# Patient Record
Sex: MALE | Race: BLACK OR AFRICAN AMERICAN | NOT HISPANIC OR LATINO
[De-identification: names, ages, dates, MRNs, and addresses within clinical notes are randomized per-mention and may not be internally consistent; named-entity substitution may affect disease eponyms.]

---

## 2017-09-18 ENCOUNTER — APPOINTMENT (OUTPATIENT)
Dept: PULMONOLOGY | Facility: CLINIC | Age: 42
End: 2017-09-18

## 2019-05-23 ENCOUNTER — APPOINTMENT (OUTPATIENT)
Dept: NEUROLOGY | Facility: CLINIC | Age: 44
End: 2019-05-23
Payer: OTHER MISCELLANEOUS

## 2019-05-23 VITALS
OXYGEN SATURATION: 99 % | HEIGHT: 71 IN | WEIGHT: 270 LBS | SYSTOLIC BLOOD PRESSURE: 140 MMHG | DIASTOLIC BLOOD PRESSURE: 90 MMHG | BODY MASS INDEX: 37.8 KG/M2

## 2019-05-23 DIAGNOSIS — Z86.59 PERSONAL HISTORY OF OTHER MENTAL AND BEHAVIORAL DISORDERS: ICD-10-CM

## 2019-05-23 DIAGNOSIS — Z87.09 PERSONAL HISTORY OF OTHER DISEASES OF THE RESPIRATORY SYSTEM: ICD-10-CM

## 2019-05-23 PROCEDURE — 99204 OFFICE O/P NEW MOD 45 MIN: CPT

## 2019-05-23 RX ORDER — OXYCODONE HYDROCHLORIDE AND ACETAMINOPHEN 10; 325 MG/1; MG/1
TABLET ORAL
Refills: 0 | Status: ACTIVE | COMMUNITY

## 2019-05-23 RX ORDER — PRAZOSIN HYDROCHLORIDE 5 MG/1
CAPSULE ORAL
Refills: 0 | Status: ACTIVE | COMMUNITY

## 2019-05-23 RX ORDER — SILDENAFIL CITRATE 100 MG/1
TABLET, FILM COATED ORAL
Refills: 0 | Status: ACTIVE | COMMUNITY

## 2019-05-23 RX ORDER — TRAZODONE HYDROCHLORIDE 300 MG/1
TABLET ORAL
Refills: 0 | Status: ACTIVE | COMMUNITY

## 2019-05-23 RX ORDER — ALBUTEROL SULFATE 90 UG/1
AEROSOL, METERED RESPIRATORY (INHALATION)
Refills: 0 | Status: ACTIVE | COMMUNITY

## 2019-05-23 NOTE — PHYSICAL EXAM
[General Appearance - Alert] : alert [General Appearance - In No Acute Distress] : in no acute distress [Oriented To Time, Place, And Person] : oriented to person, place, and time [Memory Recent] : recent memory was not impaired [Memory Remote] : remote memory was not impaired [Cranial Nerves Optic (II)] : visual acuity intact bilaterally,  visual fields full to confrontation, pupils equal round and reactive to light [Cranial Nerves Oculomotor (III)] : extraocular motion intact [Cranial Nerves Trigeminal (V)] : facial sensation intact symmetrically [Cranial Nerves Facial (VII)] : face symmetrical [Cranial Nerves Vestibulocochlear (VIII)] : hearing was intact bilaterally [Cranial Nerves Glossopharyngeal (IX)] : tongue and palate midline [Cranial Nerves Accessory (XI - Cranial And Spinal)] : head turning and shoulder shrug symmetric [Cranial Nerves Hypoglossal (XII)] : there was no tongue deviation with protrusion [Motor Tone] : muscle tone was normal in all four extremities [Motor Strength] : muscle strength was normal in all four extremities [Sensation Tactile Decrease] : light touch was intact [Sensation Pain / Temperature Decrease] : pain and temperature was intact [Sensation Vibration Decrease] : vibration was intact [2+] : Ankle jerk left 2+ [Optic Disc Abnormality] : the optic disc were normal in size and color [Edema] : there was no peripheral edema [Involuntary Movements] : no involuntary movements were seen [FreeTextEntry1] : Affect was somewhat blunted. [Dysarthria] : no dysarthria [Aphasia] : no dysphasia/aphasia [Romberg's Sign] : Romberg's sign was negtive [Coordination - Dysmetria Impaired Finger-to-Nose Bilateral] : not present [Plantar Reflex Right Only] : normal on the right [Plantar Reflex Left Only] : normal on the left [FreeTextEntry6] : Manual motor testing was markedly limited at the right deltoid and right knee [FreeTextEntry8] : Gait is antalgic. [FreeTextEntry9] : Right knee was not tested at the patient's request
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
## 2019-05-23 NOTE — CONSULT LETTER
[Dear  ___] : Dear  [unfilled], [Courtesy Letter:] : I had the pleasure of seeing your patient, [unfilled], in my office today. [Please see my note below.] : Please see my note below. [Consult Closing:] : Thank you very much for allowing me to participate in the care of this patient.  If you have any questions, please do not hesitate to contact me. [Sincerely,] : Sincerely, [FreeTextEntry3] : Richard Herman MD.  [DrRadha  ___] : Dr. RICHARDS

## 2019-05-23 NOTE — DATA REVIEWED
[de-identified] : Brain MRI was performed on 3/16/18 at [facility].\par The study was unremarkable.\par \par Cervical spine MRI was performed on 4/17/18 at the same institution.\par The study demonstrated reversal of the normal stroke consistent with muscle spasm. There was some disc bulging at C3/4, C4/5, and C5/6. The remainder of the discs were described as normal.\par \par Lumbar spine MRI was performed on 4/26/18 at the same institution.\par The study was reported as normal.\par \par
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
## 2019-05-23 NOTE — REVIEW OF SYSTEMS
[Feeling Tired] : feeling tired [Anxiety] : anxiety [Depression] : depression [Shortness Of Breath] : shortness of breath [As Noted in HPI] : as noted in HPI [Negative] : Heme/Lymph

## 2019-07-19 ENCOUNTER — APPOINTMENT (OUTPATIENT)
Dept: NEUROLOGY | Facility: CLINIC | Age: 44
End: 2019-07-19

## 2019-08-07 ENCOUNTER — APPOINTMENT (OUTPATIENT)
Dept: NEUROLOGY | Facility: CLINIC | Age: 44
End: 2019-08-07
Payer: OTHER MISCELLANEOUS

## 2019-08-07 VITALS
SYSTOLIC BLOOD PRESSURE: 120 MMHG | WEIGHT: 265 LBS | DIASTOLIC BLOOD PRESSURE: 70 MMHG | HEIGHT: 71 IN | BODY MASS INDEX: 37.1 KG/M2

## 2019-08-07 DIAGNOSIS — G44.89 OTHER HEADACHE SYNDROME: ICD-10-CM

## 2019-08-07 DIAGNOSIS — S09.90XS UNSPECIFIED INJURY OF HEAD, SEQUELA: ICD-10-CM

## 2019-08-07 PROCEDURE — 99213 OFFICE O/P EST LOW 20 MIN: CPT

## 2019-08-07 RX ORDER — DICLOFENAC POTASSIUM 50 MG/1
TABLET, COATED ORAL
Refills: 0 | Status: COMPLETED | COMMUNITY
End: 2019-08-07

## 2019-08-07 RX ORDER — LOSARTAN POTASSIUM 50 MG/1
50 TABLET, FILM COATED ORAL
Qty: 30 | Refills: 0 | Status: ACTIVE | COMMUNITY
Start: 2019-03-13

## 2019-08-07 RX ORDER — LIDOCAINE 5% 700 MG/1
5 PATCH TOPICAL
Qty: 30 | Refills: 0 | Status: ACTIVE | COMMUNITY
Start: 2019-04-09

## 2019-08-07 RX ORDER — BUPROPION HYDROCHLORIDE 150 MG/1
150 TABLET, EXTENDED RELEASE ORAL
Qty: 30 | Refills: 0 | Status: ACTIVE | COMMUNITY
Start: 2019-07-03

## 2019-08-07 RX ORDER — ALPRAZOLAM 2 MG/1
TABLET ORAL
Refills: 0 | Status: COMPLETED | COMMUNITY
End: 2019-08-07

## 2019-08-07 RX ORDER — BENZONATATE 100 MG/1
100 CAPSULE ORAL
Qty: 21 | Refills: 0 | Status: ACTIVE | COMMUNITY
Start: 2019-03-21

## 2019-08-07 RX ORDER — TIZANIDINE 2 MG/1
2 TABLET ORAL
Qty: 30 | Refills: 0 | Status: ACTIVE | COMMUNITY
Start: 2019-05-03

## 2019-08-07 RX ORDER — CHLORHEXIDINE GLUCONATE, 0.12% ORAL RINSE 1.2 MG/ML
0.12 SOLUTION DENTAL
Qty: 473 | Refills: 0 | Status: ACTIVE | COMMUNITY
Start: 2019-03-15

## 2019-08-07 RX ORDER — SILDENAFIL 20 MG/1
20 TABLET ORAL
Qty: 90 | Refills: 0 | Status: COMPLETED | COMMUNITY
Start: 2019-07-18

## 2019-08-07 RX ORDER — NAPROXEN 500 MG/1
500 TABLET ORAL
Qty: 20 | Refills: 0 | Status: ACTIVE | COMMUNITY
Start: 2019-03-21

## 2019-08-07 RX ORDER — TOPIRAMATE 50 MG/1
50 TABLET, FILM COATED ORAL
Qty: 30 | Refills: 3 | Status: ACTIVE | COMMUNITY
Start: 2019-05-23 | End: 1900-01-01

## 2019-08-07 RX ORDER — ONDANSETRON 4 MG/1
4 TABLET, ORALLY DISINTEGRATING ORAL
Qty: 9 | Refills: 0 | Status: ACTIVE | COMMUNITY
Start: 2019-02-25

## 2019-08-07 RX ORDER — ALPRAZOLAM 0.5 MG/1
0.5 TABLET ORAL
Qty: 30 | Refills: 0 | Status: ACTIVE | COMMUNITY
Start: 2019-03-05

## 2019-08-07 RX ORDER — DICLOFENAC SODIUM 75 MG/1
75 TABLET, DELAYED RELEASE ORAL
Qty: 30 | Refills: 0 | Status: ACTIVE | COMMUNITY
Start: 2019-01-24

## 2019-08-07 RX ORDER — BUPROPION HYDROCHLORIDE 100 MG/1
TABLET, FILM COATED ORAL
Refills: 0 | Status: COMPLETED | COMMUNITY
End: 2019-08-07

## 2019-08-07 RX ORDER — DESVENLAFAXINE 100 MG/1
100 TABLET, EXTENDED RELEASE ORAL
Qty: 30 | Refills: 0 | Status: ACTIVE | COMMUNITY
Start: 2018-12-05
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
## 2019-08-07 NOTE — PHYSICAL EXAM
[General Appearance - Alert] : alert [General Appearance - In No Acute Distress] : in no acute distress [Oriented To Time, Place, And Person] : oriented to person, place, and time [Memory Recent] : recent memory was not impaired [Memory Remote] : remote memory was not impaired [Cranial Nerves Optic (II)] : visual acuity intact bilaterally,  visual fields full to confrontation, pupils equal round and reactive to light [Cranial Nerves Oculomotor (III)] : extraocular motion intact [Cranial Nerves Trigeminal (V)] : facial sensation intact symmetrically [Cranial Nerves Facial (VII)] : face symmetrical [Cranial Nerves Vestibulocochlear (VIII)] : hearing was intact bilaterally [Cranial Nerves Glossopharyngeal (IX)] : tongue and palate midline [Cranial Nerves Accessory (XI - Cranial And Spinal)] : head turning and shoulder shrug symmetric [Cranial Nerves Hypoglossal (XII)] : there was no tongue deviation with protrusion [Motor Tone] : muscle tone was normal in all four extremities [Motor Strength] : muscle strength was normal in all four extremities [Sensation Pain / Temperature Decrease] : pain and temperature was intact [Sensation Tactile Decrease] : light touch was intact [Sensation Vibration Decrease] : vibration was intact [2+] : Ankle jerk right 2+ [Optic Disc Abnormality] : the optic disc were normal in size and color [Involuntary Movements] : no involuntary movements were seen [Edema] : there was no peripheral edema [FreeTextEntry1] : Affect was somewhat blunted. [Dysarthria] : no dysarthria [Aphasia] : no dysphasia/aphasia [Romberg's Sign] : Romberg's sign was negtive [Coordination - Dysmetria Impaired Finger-to-Nose Bilateral] : not present [Plantar Reflex Right Only] : normal on the right [Plantar Reflex Left Only] : normal on the left [FreeTextEntry6] : Manual motor testing was markedly limited at the right deltoid and right knee [FreeTextEntry8] : Gait is antalgic. [FreeTextEntry9] : Right knee was not tested at the patient's request

## 2019-08-07 NOTE — DATA REVIEWED
[de-identified] : Brain MRI was performed on 3/16/18 at [facility].\par The study was unremarkable.\par \par Cervical spine MRI was performed on 4/17/18 at the same institution.\par The study demonstrated reversal of the normal stroke consistent with muscle spasm. There was some disc bulging at C3/4, C4/5, and C5/6. The remainder of the discs were described as normal.\par \par Lumbar spine MRI was performed on 4/26/18 at the same institution.\par The study was reported as normal.\par \par

## 2019-08-07 NOTE — CONSULT LETTER
[Dear  ___] : Dear  [unfilled], [Courtesy Letter:] : I had the pleasure of seeing your patient, [unfilled], in my office today. [Please see my note below.] : Please see my note below. [Consult Closing:] : Thank you very much for allowing me to participate in the care of this patient.  If you have any questions, please do not hesitate to contact me. [Sincerely,] : Sincerely, [DrRadha  ___] : Dr. RICHARDS [FreeTextEntry3] : Richard Herman MD.

## 2019-08-07 NOTE — REVIEW OF SYSTEMS
[Feeling Tired] : feeling tired [Anxiety] : anxiety [Depression] : depression [As Noted in HPI] : as noted in HPI [Shortness Of Breath] : shortness of breath [Negative] : Heme/Lymph

## 2019-12-11 ENCOUNTER — APPOINTMENT (OUTPATIENT)
Dept: NEUROLOGY | Facility: CLINIC | Age: 44
End: 2019-12-11

## 2020-01-06 ENCOUNTER — OUTPATIENT (OUTPATIENT)
Dept: OUTPATIENT SERVICES | Facility: HOSPITAL | Age: 45
LOS: 1 days | End: 2020-01-06
Payer: COMMERCIAL

## 2020-01-06 VITALS
OXYGEN SATURATION: 98 % | WEIGHT: 262.35 LBS | HEART RATE: 100 BPM | HEIGHT: 70.75 IN | TEMPERATURE: 98 F | SYSTOLIC BLOOD PRESSURE: 126 MMHG | RESPIRATION RATE: 16 BRPM | DIASTOLIC BLOOD PRESSURE: 82 MMHG

## 2020-01-06 DIAGNOSIS — G56.02 CARPAL TUNNEL SYNDROME, LEFT UPPER LIMB: ICD-10-CM

## 2020-01-06 DIAGNOSIS — Z98.890 OTHER SPECIFIED POSTPROCEDURAL STATES: Chronic | ICD-10-CM

## 2020-01-06 DIAGNOSIS — Z01.818 ENCOUNTER FOR OTHER PREPROCEDURAL EXAMINATION: ICD-10-CM

## 2020-01-06 DIAGNOSIS — G56.22 LESION OF ULNAR NERVE, LEFT UPPER LIMB: ICD-10-CM

## 2020-01-06 LAB
ANION GAP SERPL CALC-SCNC: 10 MMOL/L — SIGNIFICANT CHANGE UP (ref 5–17)
BUN SERPL-MCNC: 10 MG/DL — SIGNIFICANT CHANGE UP (ref 7–23)
CALCIUM SERPL-MCNC: 8.1 MG/DL — LOW (ref 8.4–10.5)
CHLORIDE SERPL-SCNC: 100 MMOL/L — SIGNIFICANT CHANGE UP (ref 96–108)
CO2 SERPL-SCNC: 26 MMOL/L — SIGNIFICANT CHANGE UP (ref 22–31)
CREAT SERPL-MCNC: 1.11 MG/DL — SIGNIFICANT CHANGE UP (ref 0.5–1.3)
GLUCOSE SERPL-MCNC: 103 MG/DL — HIGH (ref 70–99)
HBA1C BLD-MCNC: 5.4 % — SIGNIFICANT CHANGE UP (ref 4–5.6)
HCT VFR BLD CALC: 40.8 % — SIGNIFICANT CHANGE UP (ref 39–50)
HGB BLD-MCNC: 13.1 G/DL — SIGNIFICANT CHANGE UP (ref 13–17)
MCHC RBC-ENTMCNC: 27.3 PG — SIGNIFICANT CHANGE UP (ref 27–34)
MCHC RBC-ENTMCNC: 32.1 GM/DL — SIGNIFICANT CHANGE UP (ref 32–36)
MCV RBC AUTO: 85 FL — SIGNIFICANT CHANGE UP (ref 80–100)
NRBC # BLD: 0 /100 WBCS — SIGNIFICANT CHANGE UP (ref 0–0)
PLATELET # BLD AUTO: 215 K/UL — SIGNIFICANT CHANGE UP (ref 150–400)
POTASSIUM SERPL-MCNC: 3.2 MMOL/L — LOW (ref 3.5–5.3)
POTASSIUM SERPL-SCNC: 3.2 MMOL/L — LOW (ref 3.5–5.3)
RBC # BLD: 4.8 M/UL — SIGNIFICANT CHANGE UP (ref 4.2–5.8)
RBC # FLD: 14.5 % — SIGNIFICANT CHANGE UP (ref 10.3–14.5)
SODIUM SERPL-SCNC: 136 MMOL/L — SIGNIFICANT CHANGE UP (ref 135–145)
WBC # BLD: 8.54 K/UL — SIGNIFICANT CHANGE UP (ref 3.8–10.5)
WBC # FLD AUTO: 8.54 K/UL — SIGNIFICANT CHANGE UP (ref 3.8–10.5)

## 2020-01-06 PROCEDURE — 93010 ELECTROCARDIOGRAM REPORT: CPT

## 2020-01-06 PROCEDURE — 85027 COMPLETE CBC AUTOMATED: CPT

## 2020-01-06 PROCEDURE — 80048 BASIC METABOLIC PNL TOTAL CA: CPT

## 2020-01-06 PROCEDURE — 93005 ELECTROCARDIOGRAM TRACING: CPT

## 2020-01-06 PROCEDURE — 36415 COLL VENOUS BLD VENIPUNCTURE: CPT

## 2020-01-06 PROCEDURE — 83036 HEMOGLOBIN GLYCOSYLATED A1C: CPT

## 2020-01-06 PROCEDURE — G0463: CPT

## 2020-01-06 NOTE — H&P PST ADULT - NSICDXPASTSURGICALHX_GEN_ALL_CORE_FT
PAST SURGICAL HISTORY:  H/O arthroscopy of right knee 2012 and 2019    H/O umbilical hernia repair 2011    History of arthroscopy of right shoulder 2019

## 2020-01-06 NOTE — H&P PST ADULT - RS GEN PE MLT RESP DETAILS PC
normal/airway patent/breath sounds equal/good air movement/no rhonchi/no wheezes/respirations non-labored/clear to auscultation bilaterally/no rales

## 2020-01-06 NOTE — H&P PST ADULT - MUSCULOSKELETAL
details… detailed exam diminished strength/ROM intact/no joint swelling/no joint erythema/no joint warmth/no calf tenderness/left hand, negative phalen's sign and positive tinel's sign

## 2020-01-06 NOTE — H&P PST ADULT - NSANTHOSAYNRD_GEN_A_CORE
No. CNYTHIA screening performed.  STOP BANG Legend: 0-2 = LOW Risk; 3-4 = INTERMEDIATE Risk; 5-8 = HIGH Risk/neck No. CYNTHIA screening performed.  STOP BANG Legend: 0-2 = LOW Risk; 3-4 = INTERMEDIATE Risk; 5-8 = HIGH Risk/neck 17 inches

## 2020-01-06 NOTE — H&P PST ADULT - NSICDXPASTMEDICALHX_GEN_ALL_CORE_FT
PAST MEDICAL HISTORY:  Asthma, well controlled     Cervical herniated disc unsure levels    Obesity, Class II, BMI 35-39.9     Osteoarthritis     Prediabetes     Right carpal tunnel syndrome     Right knee pain     Stomach ulcer 2009

## 2020-01-06 NOTE — H&P PST ADULT - DOES PATIENT HAVE ADVANCE DIRECTIVE
"Requested Prescriptions   Pending Prescriptions Disp Refills     simvastatin (ZOCOR) 40 MG tablet [Pharmacy Med Name: SIMVASTATIN 40 MG Tablet]  Last Written Prescription Date:  5/3/2017  Last Fill Quantity: 90 tablet,  # refills: 3   Last Office Visit  7/12/2017        with  [facility], P or [facility] prescribing provider:     Future Office Visit:    90 tablet 3     Sig: TAKE 1 TABLET EVERY DAY    Statins Protocol Passed    6/24/2018  4:42 PM       Passed - LDL on file in past 12 months    Recent Labs   Lab Test  07/12/17   1229   LDL  86          Passed - No abnormal creatine kinase in past 12 months    No lab results found.        Passed - Recent (12 mo) or future (30 days) visit within the authorizing provider's specialty    Patient had office visit in the last 12 months or has a visit in the next 30 days with authorizing provider or within the authorizing provider's specialty.  See \"Patient Info\" tab in inbasket, or \"Choose Columns\" in Meds & Orders section of the refill encounter.           Passed - Patient is age 18 or older       Passed - No active pregnancy on record       Passed - No positive pregnancy test in past 12 months          "
Medication is being filled for 1 time refill only due to:  Clinic visit due in July.    
No

## 2020-01-06 NOTE — H&P PST ADULT - NSICDXPROBLEM_GEN_ALL_CORE_FT
PROBLEM DIAGNOSES  Problem: Left carpal tunnel syndrome  Assessment and Plan: left carpal tunnel release, left ulnar nerve decompression. patient was told Jemima Acosta's office that clearance was not required but in light of tacycardia on EKG, was told the clearance is required. losartan AM of surgery with sips of water.

## 2020-01-06 NOTE — H&P PST ADULT - NSICDXFAMILYHX_GEN_ALL_CORE_FT
FAMILY HISTORY:  Father  Still living? No  Family history of death of unknown cause, Age at diagnosis: Age Unknown

## 2020-01-06 NOTE — H&P PST ADULT - HISTORY OF PRESENT ILLNESS
45 yo male presents with c/o 2 year history of left long and ring finger numbness and tingling and pain in the left wrist which is all intermittent in nature. Symptoms have worsened recently. Splint at night offers no relief. Denies using any pain relief measures.

## 2020-01-07 NOTE — ASU DISCHARGE PLAN (ADULT/PEDIATRIC) - PAIN MANAGEMENT
Prescriptions electronically submitted to pharmacy from doctor's office Percocet ordered by MD's office/Prescriptions electronically submitted to pharmacy from doctor's office

## 2020-01-07 NOTE — ASU DISCHARGE PLAN (ADULT/PEDIATRIC) - CALL YOUR DOCTOR IF YOU HAVE ANY OF THE FOLLOWING:
Wound/Surgical Site with redness, or foul smelling discharge or pus/Pain not relieved by Medications/Numbness, tingling, color or temperature change to extremity/Bleeding that does not stop/Nausea and vomiting that does not stop/Swelling that gets worse/Fever greater than (need to indicate Fahrenheit or Celsius) Bleeding that does not stop/Swelling that gets worse/Wound/Surgical Site with redness, or foul smelling discharge or pus/Pain not relieved by Medications/Nausea and vomiting that does not stop/Increased irritability or sluggishness/Numbness, tingling, color or temperature change to extremity/Fever greater than (need to indicate Fahrenheit or Celsius)/Inability to tolerate liquids or foods

## 2020-01-07 NOTE — ASU DISCHARGE PLAN (ADULT/PEDIATRIC) - CARE PROVIDER_API CALL
Cortney Acosta)  Orthopaedic Surgery  [address] [place] [address]  Phone: [phone]  Fax: [phone]  Follow Up Time:

## 2020-01-07 NOTE — ASU DISCHARGE PLAN (ADULT/PEDIATRIC) - ASU DC SPECIAL INSTRUCTIONSFT
For the next 24-48 hours while awake, alternate ice pack 15 minutes on & 15 minutes off  Follow all verbal and written instructions. Take medications as prescribed. DO NOT drive, operate machinery, and/or make important decisions while on prescription pain medication. DO NOT hesitate to call Doctor's office with questions or concerns. Certain prescription pain medication can cause constipation; take a stool softener such as Colace 100mg 3 x a day, to avoid the constipating effects of prescription pain medication.    Call Dr. Acosta's office [phone] to make an appointment to be seen on For the next 24-48 hours while awake, alternate ice pack 15 minutes on & 15 minutes off  Follow all verbal and written instructions. Take medications as prescribed. DO NOT drive, operate machinery, and/or make important decisions while on prescription pain medication. DO NOT hesitate to call Doctor's office with questions or concerns. Certain prescription pain medication can cause constipation; take a stool softener such as Colace 100mg 3 x a day, to avoid the constipating effects of prescription pain medication.    Call Dr. Acosta's office [phone] to make an appointment to be seen on 1/21/20 at 9a in [facility]

## 2020-01-07 NOTE — ASU DISCHARGE PLAN (ADULT/PEDIATRIC) - BATHING
Do not submerge in water/May shower after 48 hours.  Cover dressing with plastic sleeve/leg cover to keep it dry when showering. Plastic sleeve/leg cover can be purchased at drug store./Shower only

## 2020-01-07 NOTE — ASU DISCHARGE PLAN (ADULT/PEDIATRIC) - PROCEDURE
LEFT carpal tunnel release/LEFT ulnar nerve decomression LEFT carpal tunnel release/LEFT ulnar nerve decompression

## 2020-01-07 NOTE — ASU DISCHARGE PLAN (ADULT/PEDIATRIC) - FOLLOW UP APPOINTMENTS
[facility] or [facility], Urgent Care, Emergency Room, or call 911 911 or go to the nearest Emergency Room/[facility] or [facility], Urgent Care, Emergency Room, or call 911

## 2020-01-09 ENCOUNTER — TRANSCRIPTION ENCOUNTER (OUTPATIENT)
Age: 45
End: 2020-01-09

## 2020-01-10 ENCOUNTER — OUTPATIENT (OUTPATIENT)
Dept: OUTPATIENT SERVICES | Facility: HOSPITAL | Age: 45
LOS: 1 days | End: 2020-01-10
Payer: COMMERCIAL

## 2020-01-10 VITALS
TEMPERATURE: 98 F | OXYGEN SATURATION: 96 % | HEART RATE: 83 BPM | DIASTOLIC BLOOD PRESSURE: 83 MMHG | RESPIRATION RATE: 16 BRPM | SYSTOLIC BLOOD PRESSURE: 132 MMHG

## 2020-01-10 VITALS
SYSTOLIC BLOOD PRESSURE: 150 MMHG | TEMPERATURE: 98 F | HEIGHT: 71 IN | OXYGEN SATURATION: 100 % | HEART RATE: 88 BPM | DIASTOLIC BLOOD PRESSURE: 90 MMHG | RESPIRATION RATE: 10 BRPM | WEIGHT: 264.55 LBS

## 2020-01-10 DIAGNOSIS — Z98.890 OTHER SPECIFIED POSTPROCEDURAL STATES: Chronic | ICD-10-CM

## 2020-01-10 DIAGNOSIS — G56.22 LESION OF ULNAR NERVE, LEFT UPPER LIMB: ICD-10-CM

## 2020-01-10 DIAGNOSIS — G56.02 CARPAL TUNNEL SYNDROME, LEFT UPPER LIMB: ICD-10-CM

## 2020-01-10 PROCEDURE — 64721 CARPAL TUNNEL SURGERY: CPT | Mod: LT

## 2020-01-10 PROCEDURE — 64718 REVISE ULNAR NERVE AT ELBOW: CPT | Mod: LT

## 2020-01-10 RX ORDER — CHLORHEXIDINE GLUCONATE 213 G/1000ML
1 SOLUTION TOPICAL ONCE
Refills: 0 | Status: COMPLETED | OUTPATIENT
Start: 2020-01-10 | End: 2020-01-10

## 2020-01-10 RX ORDER — CEFAZOLIN SODIUM 1 G
2000 VIAL (EA) INJECTION ONCE
Refills: 0 | Status: COMPLETED | OUTPATIENT
Start: 2020-01-10 | End: 2020-01-10

## 2020-01-10 RX ORDER — ONDANSETRON 8 MG/1
4 TABLET, FILM COATED ORAL ONCE
Refills: 0 | Status: DISCONTINUED | OUTPATIENT
Start: 2020-01-10 | End: 2020-01-10

## 2020-01-10 RX ORDER — HYDROMORPHONE HYDROCHLORIDE 2 MG/ML
0.5 INJECTION INTRAMUSCULAR; INTRAVENOUS; SUBCUTANEOUS
Refills: 0 | Status: DISCONTINUED | OUTPATIENT
Start: 2020-01-10 | End: 2020-01-10

## 2020-01-10 RX ORDER — LOSARTAN POTASSIUM 100 MG/1
1 TABLET, FILM COATED ORAL
Qty: 0 | Refills: 0 | DISCHARGE

## 2020-01-10 RX ORDER — ALBUTEROL 90 UG/1
2 AEROSOL, METERED ORAL
Qty: 0 | Refills: 0 | DISCHARGE

## 2020-01-10 RX ORDER — SODIUM CHLORIDE 9 MG/ML
1000 INJECTION, SOLUTION INTRAVENOUS
Refills: 0 | Status: DISCONTINUED | OUTPATIENT
Start: 2020-01-10 | End: 2020-01-10

## 2020-01-10 RX ORDER — OXYCODONE AND ACETAMINOPHEN 5; 325 MG/1; MG/1
1 TABLET ORAL EVERY 4 HOURS
Refills: 0 | Status: DISCONTINUED | OUTPATIENT
Start: 2020-01-10 | End: 2020-01-10

## 2020-01-10 RX ADMIN — OXYCODONE AND ACETAMINOPHEN 1 TABLET(S): 5; 325 TABLET ORAL at 12:30

## 2020-01-10 RX ADMIN — SODIUM CHLORIDE 100 MILLILITER(S): 9 INJECTION, SOLUTION INTRAVENOUS at 11:02

## 2020-01-10 RX ADMIN — HYDROMORPHONE HYDROCHLORIDE 0.5 MILLIGRAM(S): 2 INJECTION INTRAMUSCULAR; INTRAVENOUS; SUBCUTANEOUS at 11:45

## 2020-01-10 RX ADMIN — CHLORHEXIDINE GLUCONATE 1 APPLICATION(S): 213 SOLUTION TOPICAL at 08:23

## 2020-01-10 RX ADMIN — HYDROMORPHONE HYDROCHLORIDE 0.5 MILLIGRAM(S): 2 INJECTION INTRAMUSCULAR; INTRAVENOUS; SUBCUTANEOUS at 12:00

## 2020-01-10 RX ADMIN — HYDROMORPHONE HYDROCHLORIDE 0.5 MILLIGRAM(S): 2 INJECTION INTRAMUSCULAR; INTRAVENOUS; SUBCUTANEOUS at 11:40

## 2020-01-10 RX ADMIN — HYDROMORPHONE HYDROCHLORIDE 0.5 MILLIGRAM(S): 2 INJECTION INTRAMUSCULAR; INTRAVENOUS; SUBCUTANEOUS at 11:27

## 2020-01-10 RX ADMIN — OXYCODONE AND ACETAMINOPHEN 1 TABLET(S): 5; 325 TABLET ORAL at 11:45

## 2020-01-10 NOTE — BRIEF OPERATIVE NOTE - NSICDXBRIEFPOSTOP_GEN_ALL_CORE_FT
POST-OP DIAGNOSIS:  Lesion of left ulnar nerve 10-Sujit-2020 11:18:27  Tari Thakkar  Carpal tunnel syndrome, left 10-Sujit-2020 11:18:16  Tari Thakkar

## 2020-01-10 NOTE — ASU PATIENT PROFILE, ADULT - PSH
H/O arthroscopy of right knee  2012 and 2019  H/O umbilical hernia repair  2011  History of arthroscopy of right shoulder  2019

## 2020-01-10 NOTE — BRIEF OPERATIVE NOTE - NSICDXBRIEFPREOP_GEN_ALL_CORE_FT
PRE-OP DIAGNOSIS:  Lesion of left ulnar nerve 10-Sujit-2020 11:18:04  Tari Thakkar  Carpal tunnel syndrome, left 10-Sujit-2020 11:17:51  Tari Thakkar

## 2020-01-10 NOTE — ASU PATIENT PROFILE, ADULT - PMH
Asthma, well controlled    Cervical herniated disc  unsure levels  Obesity, Class II, BMI 35-39.9    Osteoarthritis    Prediabetes    Right carpal tunnel syndrome    Right knee pain    Stomach ulcer  2009

## 2020-01-10 NOTE — BRIEF OPERATIVE NOTE - NSICDXBRIEFPROCEDURE_GEN_ALL_CORE_FT
PROCEDURES:  Decompression of left ulnar nerve 10-Sujit-2020 11:17:31  Tari Thakkar  Open release of left carpal tunnel 10-Sujit-2020 11:17:05  Tari Thakkar

## 2020-01-10 NOTE — ASU PREOP CHECKLIST - DENTURES
Discussion/Summary  Discussion Summary:   See note in systoc  Chief Complaint  Chief Complaint Free Text Note Form: Pt is a workman comp patient   His chart will be found in [facility]   Electronically signed by : Anne Marie Murray NP; Dec 14 2017 10:31AM EST                       (Author)    Electronically signed by : RENE Head ; Dec 14 2017 12:40PM EST                       (Co-author)
no

## 2020-03-11 NOTE — H&P PST ADULT - LAST ECHOCARDIOGRAM
-- DO NOT REPLY / DO NOT REPLY ALL --  -- Message is from the [facility]--    General Patient Message      Reason for Call: Patient is calling stated Dr. José ordered a ultrasound of the liver just wanted to inform PCP and asking doctor to review it also.     Caller Information       Type Contact Phone    03/11/2020 11:45 AM Phone (Incoming) Ellen Shaw (Self) [phone] (H)          Alternative phone number: none    Turnaround time given to caller:   \"This message will be sent to [state Provider's name]. The clinical team will fulfill your request as soon as they review your message.\"}     2017

## 2021-01-05 NOTE — ASU DISCHARGE PLAN (ADULT/PEDIATRIC) - ACTIVITY LEVEL
Elevate extremity/No sports/gym/No tub baths/No heavy lifting Two to three times per week until cleared by surgeon/No tub baths/No heavy lifting/No sports/gym/No weight bearing/Elevate extremity